# Patient Record
Sex: MALE | Race: WHITE | URBAN - METROPOLITAN AREA
[De-identification: names, ages, dates, MRNs, and addresses within clinical notes are randomized per-mention and may not be internally consistent; named-entity substitution may affect disease eponyms.]

---

## 2021-04-19 ENCOUNTER — OFFICE VISIT (OUTPATIENT)
Dept: DERMATOLOGY | Age: 36
End: 2021-04-19
Payer: COMMERCIAL

## 2021-04-19 VITALS
OXYGEN SATURATION: 98 % | BODY MASS INDEX: 31.18 KG/M2 | DIASTOLIC BLOOD PRESSURE: 84 MMHG | WEIGHT: 250.8 LBS | SYSTOLIC BLOOD PRESSURE: 121 MMHG | HEART RATE: 110 BPM | HEIGHT: 75 IN

## 2021-04-19 DIAGNOSIS — L50.8 CHRONIC URTICARIA: Primary | ICD-10-CM

## 2021-04-19 PROCEDURE — 99204 OFFICE O/P NEW MOD 45 MIN: CPT | Performed by: DERMATOLOGY

## 2021-04-19 RX ORDER — CETIRIZINE HYDROCHLORIDE 10 MG/1
CAPSULE, LIQUID FILLED ORAL
Qty: 120 CAPSULE | Refills: 1 | COMMUNITY
Start: 2021-04-19

## 2021-04-19 NOTE — PATIENT INSTRUCTIONS
- Chronic idiopathic urticaria  - Labs today  - Zyrtec 2 tablets twice daily; if not improved, we will refer to allergy    - URTICARIA- HIVES  - Urticaria is the medical name for hives. These are welts, pink swellings that come up on any part of the skin. They itch and each individual hive lasts a few hours before fading away, leaving no trace. New hives appear as old areas fade. They can be pea-sized or join to cover broad areas of the body. While the itch can be intense, the skin is usually not scabbed or broken. In some people the hives burn or sting.  - Hives are very common. About 10-20% of the population will have at least one episode in their lifetime. Hives can sometimes occur in the deeper tissues of the eyes, mouth, hands or genitals. These areas may develop a swelling that is frightening in appearance, but usually goes away in less than 24 hours. This swelling is called angioedema.  - In some cases (acute hives), a single attack of hives is due to an infection or virus and these go away within a few days to a few weeks. Some people get attacks that occur as an allergic reaction to foods, most commonly nuts, chocolate, fish, tomatoes, eggs, fresh berries and milk, insect stings, and medications. In this case, they usually break out within a few hours of the exposure. Usually, the patient figures out the cause by themselves and they never come to a doctor. - Certain people can develop recurrent hives from sunlight, cold, pressure, vibration or exercise. These are called the physical urticarias.   If hives develop from scratching or firmly rubbing the skin it is called dermatographism.   It is the most common of the physical urticarias and it affects about 5% of the population. Dermatographism doesn't always itch, and this condition sometimes occurs along with other forms of hives. - Some people get hives as a reaction to anything that makes them hot or sweaty.  This can be sunlight, exercise, hot baths, blushing or anger. These hives, called cholinergic urticaria, are tiny intensely itchy hives with a red blotch around them. - Pressure urticaria shows up as a deep welt in an area of prolonged pressure. Occasionally people get hives from exposure to the cold. Even rarer is urticaria due to sunlight.  - Occasionally, a person will continue to have hives for many years. These hives, called chronic urticaria, can be one of the most frustrating problems dermatologists see. Chronic urticaria is defined as hives lasting longer than 6 weeks. Patients like this come in miserable and worried with this problem, often having seen multiple specialists. Neither the patient nor the doctor can determine the cause of the hives. Patients will often say \"there has to be something causing these hives.   The truth is hard to accept for some patients. - In the overwhelming majority of cases it is not \"something\" causing the chronic hives, it is \"nothing\". That is, in about 95% of chronic hives cases, the hives are \"idiopathic\" (a medical term that means there is no discernible cause). Because of those 5% of cases with a cause, it is worthwhile to see a physician to determine if any underlying disease is present (e.g. thyroid problems, liver problems, skin disease, and sinusitis) or if there is an allergic cause (e.g. a reaction to a drug, insect, food, etc.). This can be accomplished by a good history and physical, a few blood and urine tests and sometimes a skin biopsy. - In about half of patients with chronic idiopathic urticaria, the explanation is that the body's immune system is, in a sense, overactive. The urticaria is \"autoimmune. \"  The immune system is attacking the normal tissues of the body and causing hives as a result.   We know certain urticaria sufferers have other signs of autoimmune problems.  - So in many patients with chronic hives, there is really no exposure (drug, food, insect, chemical) to blame for the urticaria. The patient must understand and accept this for their ideal management. Basically, all that needs to be done is treat the hives. The main treatment of hives is antihistamines, and they will work if they are used properly. Common reasons for lack of effectiveness of antihistamines are:    -  1. The particular antihistamine used is not strong enough  -  2. The antihistamine is not used in a high enough dose  -  3. The antihistamines are not continued for a long enough period  - The most well tolerated initial treatment is non-sedating antihistamines. If that doesn't eliminate the hives, a sedating-type of antihistamine (hydroxyzine, cyproheptadine or doxepin) is added at night. High doses may be needed and this will cause sedation. Fortunately, most patients will become less affected by sedation after they have taken the drug regularly for a while. - Hives that do not respond to antihistamines may be treated with doxepin. Doxepin is an antidepressant that has strong antihistamine effects, blocking both H1 and H2 receptors. Doxepin can be very sedating. Doxepin has not been approved by the FDA for treating hives, but most physicians feel this is an appropriate use. - If that doesn't work, some doctors may try a short course of oral steroids to clear the hives completely. Then the patient can maintain the effect with the much safer antihistamines, since steroids have significant side effects if used long term. - There are other medications that may be added to the antihistamines, but these non-standard therapies are not always effective. However, if the hives are not responding, they are worth a try. Examples are antacid pills (Tagamet, Zantac), dapsone and sulfasalazine (anti-inflammatory antibiotics). A new medication to treat hives is omalizumab (Xolair).   - The important thing is that the patient is given enough medication (antihistamines, perhaps in conjunction with other drugs) to suppress the hives. Whatever it is that controls a patients hives, the important thing is to continue following a daily regimen, taking the drugs every day whether or not they have the hives on any given day. The idea is that you are preventing the hives from breaking out. - Some doctors suggest that medications should be continued for long periods, perhaps even a month after the hives have disappeared. Again, the exception to this is the cortisone/steroid - type medications, which should only be used for short periods initially to quiet down the urticaria. Remember that you must work closely with your doctor to find a medication regimen that suppresses the hives until they resolve on their own. - POSSIBLE CAUSES OF ACUTE HIVES  - Nonsteroidal anti-inflammatory drugs (NSAIDS)-- aspirin, ibuprofen, or naproxen  - Antibiotics--  penicillins, cephalosporins, quinolone antibiotics (e.g. ciprofloxacin) and sulfa antibiotics (e.g. sulfamethoxazole)  - Hormones -- the hormones present in oral contraceptives and hormone replacement therapy  - Painkillers -- (e.g. codeine and morphine), and muscle relaxants used in anesthesia  - Contrast solutions -- given into the vein during x-ray procedures  - Physical contact with allergens -- animal saliva, plant products and resins, raw fish or vegetables, and latex (Latex is present in many medical and household products, including gloves, balloons, and condoms. )  - Insect stings -- stings from certain insects, such as, bees, wasps, hornets, and fire ants.

## 2021-04-19 NOTE — PROGRESS NOTES
Dermatology Patient Note  [facility]  [address]  [address]  Dept: [phone]  Dept Fax: [phone]      VISITDATE: 4/19/2021   REFERRING PROVIDER: No ref. provider found      Nancy Roy is a 38 y.o. male  who presents today in the office for:    New Patient (Rash all over body. Pt states it is a hit miss. Tried allergy medication and benadryl with no relief. Started last summer. )      PERTINENT HISTORY NOT LISTED ABOVE:  Patient presents for evaluation of rash all over body  - it comes and goes with itchy hives  - no hive lasts longer than 24 hours  - zyrtec, benadryl, claritin, allegra help with itching but don't take hives away. Highest dose he's used is benadryl twice daily  - takes no other medications or supplements. He is otherwise healthy. Gets heartburn about once weekly    CURRENT MEDICATIONS:   Current Outpatient Medications   Medication Sig Dispense Refill    Cetirizine HCl (ZYRTEC ALLERGY) 10 MG CAPS Take two pills PO twice daily 120 capsule 1     No current facility-administered medications for this visit. ALLERGIES:   No Known Allergies    SOCIAL HISTORY:  Social History     Tobacco Use    Smoking status: Current Every Day Smoker    Smokeless tobacco: Never Used   Substance Use Topics    Alcohol use: Not on file       Pertinent ROS:  Review of Systems  Skin: Denies any new changing, growing or bleeding lesions or rashes except as described in the HPI   Constitutional: Denies fevers, chills, and malaise.     PHYSICAL EXAM:   /84 (Site: Left Upper Arm, Position: Sitting, Cuff Size: Large Adult)   Pulse 110   Ht 6' 3\" (1.905 m)   Wt 250 lb 12.8 oz (113.8 kg)   SpO2 98%   BMI 31.35 kg/m²     The patient is generally well appearing, well nourished, alert and conversational. Affect is normal.    Cutaneous Exam:  Physical Exam  Head/face,neck, both arms, chest, back, abdomen, digits and/or nails, and limited lower extremities (that which is visible with pants/shorts and shoes/socks on) was examined. Diagnoses/exam findings/medical history pertinent to this visit are listed below:    Assessment and Plan:  Assessment   1. Chronic urticaria  - discussed diagnosis, etiology, natural course, and treatment options  - chronic illness with progression and/or exacerbation  - ongoing for >1 year, with hives most days  - start zyrtec 20 mg BID. Consider xolair if not adequate  - lab work-up for underlying cause, discussed with patient that these are usually negative  - he has some reflux which may indicate h. Pylori, which can cause hives  - JACKY Screen with Reflex; Future  - CBC Auto Differential; Future  - Comprehensive Metabolic Panel; Future  - Electrophoresis Protein, Serum without Reflex to Immunofixation; Future  - Ferritin; Future  - Hepatitis Panel, Acute; Future  - HIV Screen; Future  - Iron and TIBC; Future  - TSH with Reflex; Future  - H. Pylori Breath Test; Future  - Cetirizine HCl (ZYRTEC ALLERGY) 10 MG CAPS; Take two pills PO twice daily  Dispense: 120 capsule; Refill: 1          RTC pending labs    Patient Instructions   - Chronic idiopathic urticaria  - Labs today  - Zyrtec 2 tablets twice daily; if not improved, we will refer to allergy    - URTICARIA- HIVES  - Urticaria is the medical name for hives. These are welts, pink swellings that come up on any part of the skin. They itch and each individual hive lasts a few hours before fading away, leaving no trace. New hives appear as old areas fade. They can be pea-sized or join to cover broad areas of the body. While the itch can be intense, the skin is usually not scabbed or broken. In some people the hives burn or sting.  - Hives are very common. About 10-20% of the population will have at least one episode in their lifetime. Hives can sometimes occur in the deeper tissues of the eyes, mouth, hands or genitals.   These areas may develop a swelling that is frightening in appearance, but usually goes away in less than 24 hours. This swelling is called angioedema.  - In some cases (acute hives), a single attack of hives is due to an infection or virus and these go away within a few days to a few weeks. Some people get attacks that occur as an allergic reaction to foods, most commonly nuts, chocolate, fish, tomatoes, eggs, fresh berries and milk, insect stings, and medications. In this case, they usually break out within a few hours of the exposure. Usually, the patient figures out the cause by themselves and they never come to a doctor. - Certain people can develop recurrent hives from sunlight, cold, pressure, vibration or exercise. These are called the physical urticarias.   If hives develop from scratching or firmly rubbing the skin it is called dermatographism.   It is the most common of the physical urticarias and it affects about 5% of the population. Dermatographism doesn't always itch, and this condition sometimes occurs along with other forms of hives. - Some people get hives as a reaction to anything that makes them hot or sweaty. This can be sunlight, exercise, hot baths, blushing or anger. These hives, called cholinergic urticaria, are tiny intensely itchy hives with a red blotch around them. - Pressure urticaria shows up as a deep welt in an area of prolonged pressure. Occasionally people get hives from exposure to the cold. Even rarer is urticaria due to sunlight.  - Occasionally, a person will continue to have hives for many years. These hives, called chronic urticaria, can be one of the most frustrating problems dermatologists see. Chronic urticaria is defined as hives lasting longer than 6 weeks. Patients like this come in miserable and worried with this problem, often having seen multiple specialists. Neither the patient nor the doctor can determine the cause of the hives.   Patients will often say \"there has to be something causing these hives.   The truth is hard to accept for some patients. - In the overwhelming majority of cases it is not \"something\" causing the chronic hives, it is \"nothing\". That is, in about 95% of chronic hives cases, the hives are \"idiopathic\" (a medical term that means there is no discernible cause). Because of those 5% of cases with a cause, it is worthwhile to see a physician to determine if any underlying disease is present (e.g. thyroid problems, liver problems, skin disease, and sinusitis) or if there is an allergic cause (e.g. a reaction to a drug, insect, food, etc.). This can be accomplished by a good history and physical, a few blood and urine tests and sometimes a skin biopsy. - In about half of patients with chronic idiopathic urticaria, the explanation is that the body's immune system is, in a sense, overactive. The urticaria is \"autoimmune. \"  The immune system is attacking the normal tissues of the body and causing hives as a result. We know certain urticaria sufferers have other signs of autoimmune problems.  - So in many patients with chronic hives, there is really no exposure (drug, food, insect, chemical) to blame for the urticaria. The patient must understand and accept this for their ideal management. Basically, all that needs to be done is treat the hives. The main treatment of hives is antihistamines, and they will work if they are used properly. Common reasons for lack of effectiveness of antihistamines are:    -  1. The particular antihistamine used is not strong enough  -  2. The antihistamine is not used in a high enough dose  -  3. The antihistamines are not continued for a long enough period  - The most well tolerated initial treatment is non-sedating antihistamines. If that doesn't eliminate the hives, a sedating-type of antihistamine (hydroxyzine, cyproheptadine or doxepin) is added at night. High doses may be needed and this will cause sedation.   Fortunately, most patients will become less affected by sedation after they have taken the drug regularly for a while. - Hives that do not respond to antihistamines may be treated with doxepin. Doxepin is an antidepressant that has strong antihistamine effects, blocking both H1 and H2 receptors. Doxepin can be very sedating. Doxepin has not been approved by the FDA for treating hives, but most physicians feel this is an appropriate use. - If that doesn't work, some doctors may try a short course of oral steroids to clear the hives completely. Then the patient can maintain the effect with the much safer antihistamines, since steroids have significant side effects if used long term. - There are other medications that may be added to the antihistamines, but these non-standard therapies are not always effective. However, if the hives are not responding, they are worth a try. Examples are antacid pills (Tagamet, Zantac), dapsone and sulfasalazine (anti-inflammatory antibiotics). A new medication to treat hives is omalizumab (Xolair). - The important thing is that the patient is given enough medication (antihistamines, perhaps in conjunction with other drugs) to suppress the hives. Whatever it is that controls a patients hives, the important thing is to continue following a daily regimen, taking the drugs every day whether or not they have the hives on any given day. The idea is that you are preventing the hives from breaking out. - Some doctors suggest that medications should be continued for long periods, perhaps even a month after the hives have disappeared. Again, the exception to this is the cortisone/steroid - type medications, which should only be used for short periods initially to quiet down the urticaria. Remember that you must work closely with your doctor to find a medication regimen that suppresses the hives until they resolve on their own.   - POSSIBLE CAUSES OF ACUTE HIVES  - Nonsteroidal anti-inflammatory drugs (NSAIDS)-- aspirin, ibuprofen, or naproxen  - Antibiotics--  penicillins, cephalosporins, quinolone antibiotics (e.g. ciprofloxacin) and sulfa antibiotics (e.g. sulfamethoxazole)  - Hormones -- the hormones present in oral contraceptives and hormone replacement therapy  - Painkillers -- (e.g. codeine and morphine), and muscle relaxants used in anesthesia  - Contrast solutions -- given into the vein during x-ray procedures  - Physical contact with allergens -- animal saliva, plant products and resins, raw fish or vegetables, and latex (Latex is present in many medical and household products, including gloves, balloons, and condoms. )  - Insect stings -- stings from certain insects, such as, bees, wasps, hornets, and fire ants. This note was created with the assistance of a speech-recognition program.  Although the intention is to generate a document that actually reflects the content of the visit, no guarantees can be provided that every mistake has been identified and corrected byediting.     Electronically signed by Christina Collado MD on 4/19/21 at 11:32 AM EDT